# Patient Record
Sex: MALE | Race: WHITE
[De-identification: names, ages, dates, MRNs, and addresses within clinical notes are randomized per-mention and may not be internally consistent; named-entity substitution may affect disease eponyms.]

---

## 2018-01-10 ENCOUNTER — HOSPITAL ENCOUNTER (EMERGENCY)
Dept: HOSPITAL 96 - M.ERS | Age: 83
Discharge: HOME | End: 2018-01-10
Payer: COMMERCIAL

## 2018-01-10 VITALS — DIASTOLIC BLOOD PRESSURE: 52 MMHG | SYSTOLIC BLOOD PRESSURE: 93 MMHG

## 2018-01-10 VITALS — HEIGHT: 62 IN | WEIGHT: 178 LBS | BODY MASS INDEX: 32.76 KG/M2
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
## 2018-01-10 DIAGNOSIS — Z85.46: ICD-10-CM
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
## 2018-01-10 DIAGNOSIS — Z87.891: ICD-10-CM
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
## 2018-01-10 DIAGNOSIS — S00.83XA: Primary | ICD-10-CM

## 2018-01-10 DIAGNOSIS — S22.41XA: ICD-10-CM

## 2018-01-10 LAB
ABSOLUTE BASOPHILS: 0 THOU/UL (ref 0–0.2)
ABSOLUTE EOSINOPHILS: 0.1 THOU/UL (ref 0–0.7)
ABSOLUTE MONOCYTES: 0.4 THOU/UL (ref 0–1.2)
ALBUMIN SERPL-MCNC: 3.7 G/DL (ref 3.4–5)
ALP SERPL-CCNC: 81 U/L (ref 46–116)
ALT SERPL-CCNC: 19 U/L (ref 30–65)
ANION GAP SERPL CALC-SCNC: 13 MMOL/L (ref 7–16)
AST SERPL-CCNC: 28 U/L (ref 15–37)
BASOPHILS NFR BLD AUTO: 0.6 %
BILIRUB SERPL-MCNC: 0.8 MG/DL
BUN SERPL-MCNC: 18 MG/DL (ref 7–18)
CALCIUM SERPL-MCNC: 8.7 MG/DL (ref 8.5–10.1)
CHLORIDE SERPL-SCNC: 105 MMOL/L (ref 98–107)
CO2 SERPL-SCNC: 24 MMOL/L (ref 21–32)
CREAT SERPL-MCNC: 1.7 MG/DL (ref 0.6–1.3)
EOSINOPHIL NFR BLD: 2.1 %
GLUCOSE SERPL-MCNC: 110 MG/DL (ref 70–99)
GRANULOCYTES NFR BLD MANUAL: 73.4 %
HCT VFR BLD CALC: 29.2 % (ref 42–52)
HGB BLD-MCNC: 9.3 GM/DL (ref 14–18)
LYMPHOCYTES # BLD: 1 THOU/UL (ref 0.8–5.3)
LYMPHOCYTES NFR BLD AUTO: 16.6 %
MCH RBC QN AUTO: 24 PG (ref 26–34)
MCHC RBC AUTO-ENTMCNC: 32 G/DL (ref 28–37)
MCV RBC: 75 FL (ref 80–100)
MONOCYTES NFR BLD: 7.3 %
MPV: 8 FL. (ref 7.2–11.1)
NEUTROPHILS # BLD: 4.3 THOU/UL (ref 1.6–8.1)
NUCLEATED RBCS: 0 /100WBC
PLATELET COUNT*: 192 THOU/UL (ref 150–400)
POTASSIUM SERPL-SCNC: 3.9 MMOL/L (ref 3.5–5.1)
PROT SERPL-MCNC: 7.2 G/DL (ref 6.4–8.2)
RBC # BLD AUTO: 3.89 MIL/UL (ref 4.5–6)
RDW-CV: 19.5 % (ref 10.5–14.5)
SODIUM SERPL-SCNC: 142 MMOL/L (ref 136–145)
WBC # BLD AUTO: 5.8 THOU/UL (ref 4–11)

## 2018-01-10 NOTE — EKG
[place] [address]
Phone:  [phone]                     ELECTROCARDIOGRAM REPORT      
_______________________________________________________________________________
 
Name:       DONOVAN ARAUJO                   [facility]                      [facility] [place]  [id number]      Account #:      [id number]  
Admission:  01/10/18     Attend Phys:                         
Discharge:  01/10/18     Date of Birth:  [birth year]  
         Report #: [id number]
    [id number]
_______________________________________________________________________________
THIS REPORT FOR:  //name//                      
 
                         [facility] ED
                                       
Test Date:    2018-01-10               Test Time:    08:05:22
Pat Name:     DONOVAN ARAUJO               Department:   
Patient ID:   [id number]            Room:          
Gender:       M                        Technician:   JEZ ESPINOSA
:          1931               Requested By: Brayan Majano
Order Number: [id number] MD:   Vargas Sosa
                                 Measurements
Intervals                              Axis          
Rate:         69                       P:            59
ME:           171                      QRS:          -17
QRSD:         104                      T:            84
QT:           436                                    
QTc:          467                                    
                           Interpretive Statements
Sinus rhythm
Multiple premature complexes, vent & supraven
Borderline left axis deviation
Low voltage, extremity leads
Baseline wander in lead(s) V2,V3,V4,V5
No previous ECG available for comparison
 
Electronically Signed On 1- 11:30:04 CST by Vargas Sosa
[email or web address]
 
 
 
 
 
 
 
 
 
 
 
 
 
 
 
 
  <ELECTRONICALLY SIGNED>
                                           By: Vargas Sosa MD, [facility]      
  01/10/18     1130
D: 01/10/18 0805   _____________________________________
T: 01/10/18 0805   Vargas Sosa MD, [facility]        /EPI